# Patient Record
Sex: MALE | NOT HISPANIC OR LATINO | URBAN - METROPOLITAN AREA
[De-identification: names, ages, dates, MRNs, and addresses within clinical notes are randomized per-mention and may not be internally consistent; named-entity substitution may affect disease eponyms.]

---

## 2019-05-02 ENCOUNTER — OFFICE VISIT - HEALTHEAST (OUTPATIENT)
Dept: FAMILY MEDICINE | Facility: CLINIC | Age: 49
End: 2019-05-02

## 2019-05-02 DIAGNOSIS — C83.30 DIFFUSE LARGE B-CELL LYMPHOMA, UNSPECIFIED BODY REGION (H): ICD-10-CM

## 2019-05-02 DIAGNOSIS — Z00.00 ROUTINE GENERAL MEDICAL EXAMINATION AT A HEALTH CARE FACILITY: ICD-10-CM

## 2019-05-02 DIAGNOSIS — Z91.09 ENVIRONMENTAL ALLERGIES: ICD-10-CM

## 2019-05-02 ASSESSMENT — MIFFLIN-ST. JEOR: SCORE: 1814.37

## 2019-05-03 ENCOUNTER — AMBULATORY - HEALTHEAST (OUTPATIENT)
Dept: LAB | Facility: CLINIC | Age: 49
End: 2019-05-03

## 2019-05-03 DIAGNOSIS — C83.30 DIFFUSE LARGE B-CELL LYMPHOMA, UNSPECIFIED BODY REGION (H): ICD-10-CM

## 2019-05-03 DIAGNOSIS — Z00.00 ROUTINE GENERAL MEDICAL EXAMINATION AT A HEALTH CARE FACILITY: ICD-10-CM

## 2019-05-03 LAB
ALBUMIN SERPL-MCNC: 4.1 G/DL (ref 3.5–5)
ALBUMIN UR-MCNC: NEGATIVE MG/DL
ALP SERPL-CCNC: 80 U/L (ref 45–120)
ALT SERPL W P-5'-P-CCNC: 31 U/L (ref 0–45)
ANION GAP SERPL CALCULATED.3IONS-SCNC: 11 MMOL/L (ref 5–18)
APPEARANCE UR: CLEAR
AST SERPL W P-5'-P-CCNC: 29 U/L (ref 0–40)
BILIRUB DIRECT SERPL-MCNC: 0.5 MG/DL
BILIRUB SERPL-MCNC: 1.5 MG/DL (ref 0–1)
BILIRUB UR QL STRIP: NEGATIVE
BUN SERPL-MCNC: 16 MG/DL (ref 8–22)
CALCIUM SERPL-MCNC: 9.7 MG/DL (ref 8.5–10.5)
CHLORIDE BLD-SCNC: 104 MMOL/L (ref 98–107)
CHOLEST SERPL-MCNC: 197 MG/DL
CO2 SERPL-SCNC: 25 MMOL/L (ref 22–31)
COLOR UR AUTO: YELLOW
CREAT SERPL-MCNC: 0.9 MG/DL (ref 0.7–1.3)
ERYTHROCYTE [DISTWIDTH] IN BLOOD BY AUTOMATED COUNT: 11.1 % (ref 11–14.5)
FASTING STATUS PATIENT QL REPORTED: YES
GFR SERPL CREATININE-BSD FRML MDRD: >60 ML/MIN/1.73M2
GLUCOSE BLD-MCNC: 89 MG/DL (ref 70–125)
GLUCOSE UR STRIP-MCNC: NEGATIVE MG/DL
HCT VFR BLD AUTO: 49.7 % (ref 40–54)
HDLC SERPL-MCNC: 47 MG/DL
HGB BLD-MCNC: 16.5 G/DL (ref 14–18)
HGB UR QL STRIP: NEGATIVE
KETONES UR STRIP-MCNC: NEGATIVE MG/DL
LDLC SERPL CALC-MCNC: 133 MG/DL
LEUKOCYTE ESTERASE UR QL STRIP: NEGATIVE
MCH RBC QN AUTO: 31 PG (ref 27–34)
MCHC RBC AUTO-ENTMCNC: 33.2 G/DL (ref 32–36)
MCV RBC AUTO: 93 FL (ref 80–100)
NITRATE UR QL: NEGATIVE
PH UR STRIP: 7 [PH] (ref 5–8)
PLATELET # BLD AUTO: 217 THOU/UL (ref 140–440)
PMV BLD AUTO: 7.3 FL (ref 7–10)
POTASSIUM BLD-SCNC: 4.2 MMOL/L (ref 3.5–5)
PROT SERPL-MCNC: 7 G/DL (ref 6–8)
PSA SERPL-MCNC: 0.4 NG/ML (ref 0–2.5)
RBC # BLD AUTO: 5.32 MILL/UL (ref 4.4–6.2)
SODIUM SERPL-SCNC: 140 MMOL/L (ref 136–145)
SP GR UR STRIP: 1.02 (ref 1–1.03)
TRIGL SERPL-MCNC: 86 MG/DL
UROBILINOGEN UR STRIP-ACNC: NORMAL
WBC: 5.9 THOU/UL (ref 4–11)

## 2019-05-16 ENCOUNTER — HOSPITAL ENCOUNTER (OUTPATIENT)
Dept: CARDIOLOGY | Facility: HOSPITAL | Age: 49
Discharge: HOME | End: 2019-05-16
Attending: FAMILY MEDICINE

## 2019-05-16 DIAGNOSIS — C83.30 DIFFUSE LARGE B-CELL LYMPHOMA, UNSPECIFIED BODY REGION (H): ICD-10-CM

## 2019-05-16 ASSESSMENT — MIFFLIN-ST. JEOR: SCORE: 1814.37

## 2019-05-17 LAB
AORTIC ROOT: 3.3 CM
AORTIC VALVE MEAN VELOCITY: 109 CM/S
AV DIMENSIONLESS INDEX VTI: 0.7
AV MEAN GRADIENT: 5 MMHG
AV PEAK GRADIENT: 6.8 MMHG
AV VALVE AREA: 2.3 CM2
AV VELOCITY RATIO: 0.8
BSA FOR ECHO PROCEDURE: 2.17 M2
CV BLOOD PRESSURE: NORMAL MMHG
CV ECHO HEIGHT: 69.8 IN
CV ECHO WEIGHT: 211 LBS
DOP CALC AO PEAK VEL: 130 CM/S
DOP CALC AO VTI: 32.8 CM
DOP CALC LVOT AREA: 3.46 CM2
DOP CALC LVOT DIAMETER: 2.1 CM
DOP CALC LVOT PEAK VEL: 97.5 CM/S
DOP CALC LVOT STROKE VOLUME: 76.2 CM3
DOP CALCLVOT PEAK VEL VTI: 22 CM
EJECTION FRACTION: 63 % (ref 55–75)
FRACTIONAL SHORTENING: 28 % (ref 28–44)
INTERVENTRICULAR SEPTUM IN END DIASTOLE: 0.72 CM (ref 0.6–1)
IVS/PW RATIO: 0.7
LEFT ATRIUM SIZE: 3.8 CM
LEFT VENTRICLE CARDIAC INDEX: 2.1 L/MIN/M2
LEFT VENTRICLE CARDIAC OUTPUT: 4.5 L/MIN
LEFT VENTRICLE DIASTOLIC VOLUME INDEX: 56.2 CM3/M2 (ref 34–74)
LEFT VENTRICLE DIASTOLIC VOLUME: 122 CM3 (ref 62–150)
LEFT VENTRICLE HEART RATE: 59 BPM
LEFT VENTRICLE MASS INDEX: 53.4 G/M2
LEFT VENTRICLE SYSTOLIC VOLUME INDEX: 20.7 CM3/M2 (ref 11–31)
LEFT VENTRICLE SYSTOLIC VOLUME: 44.9 CM3 (ref 21–61)
LEFT VENTRICULAR INTERNAL DIMENSION IN DIASTOLE: 4.32 CM (ref 4.2–5.8)
LEFT VENTRICULAR INTERNAL DIMENSION IN SYSTOLE: 3.11 CM (ref 2.5–4)
LEFT VENTRICULAR MASS: 115.9 G
LEFT VENTRICULAR OUTFLOW TRACT MEAN GRADIENT: 2 MMHG
LEFT VENTRICULAR OUTFLOW TRACT MEAN VELOCITY: 71.5 CM/S
LEFT VENTRICULAR OUTFLOW TRACT PEAK GRADIENT: 4 MMHG
LEFT VENTRICULAR POSTERIOR WALL IN END DIASTOLE: 0.99 CM (ref 0.6–1)
LV STROKE VOLUME INDEX: 35.1 ML/M2
MITRAL VALVE E/A RATIO: 1.1
MV AVERAGE E/E' RATIO: 6.7 CM/S
MV DECELERATION TIME: 194 MS
MV E'TISSUE VEL-LAT: 13.7 CM/S
MV E'TISSUE VEL-MED: 8.51 CM/S
MV LATERAL E/E' RATIO: 5.4
MV MEDIAL E/E' RATIO: 8.7
MV PEAK A VELOCITY: 67.9 CM/S
MV PEAK E VELOCITY: 74.2 CM/S
NUC REST DIASTOLIC VOLUME INDEX: 3376 LBS
NUC REST SYSTOLIC VOLUME INDEX: 69.75 IN
TRICUSPID REGURGITATION PEAK PRESSURE GRADIENT: 17 MMHG
TRICUSPID VALVE PEAK REGURGITANT VELOCITY: 206 CM/S

## 2019-05-22 ENCOUNTER — COMMUNICATION - HEALTHEAST (OUTPATIENT)
Dept: FAMILY MEDICINE | Facility: CLINIC | Age: 49
End: 2019-05-22

## 2021-05-25 ENCOUNTER — RECORDS - HEALTHEAST (OUTPATIENT)
Dept: ADMINISTRATIVE | Facility: CLINIC | Age: 51
End: 2021-05-25

## 2021-05-28 NOTE — PATIENT INSTRUCTIONS - HE
Increase physical activity.  Your goal is 30 minutes most days  You received the tetanus booster today  Set up the echocardiogram/ultrasound of the heart  You may set up a fasting lab appointment   When you turn 49 you may consider the Shingrix/shingles vaccine  You may consider the colonoscopy at age 49 as well  It was great to see you

## 2021-05-28 NOTE — PROGRESS NOTES
Assessment/ Plan     1. Routine general medical examination at a health care facility    Recommend that he remain physically active  - Lipid Cascade; Future    Reviewed prostate cancer screening  - PSA (Prostatic-Specific Antigen), Annual Screen; Future     A tetanus booster was given     When he turns 49 he can consider the Shingrix/shingles vaccine  When he turns 49 he will be a candidate for a colonoscopy        2. Diffuse large B-cell lymphoma, unspecified body region (H)    Reviewed the consultation from his hematologist dated May 17, 2012 regarding ongoing surveillance  He is in remission but did require chemotherapy which may place him at higher risks including a slightly increased risk of bladder cancer over the long-term  Doxorubicin is a known cardiotoxin and can cause a cardiomyopathy  A repeat echocardiogram has been recommended every 5-8 years given his treatment  Reviewed that if he develops any urinary symptoms of concern than he should have a urinalysis and evaluation as appropriate  We will monitor his blood counts periodically    This was reviewed in detail     After reviewing with the patient decision was made to check an echocardiogram    We will check a urinalysis  We will check blood test as noted     - Echo Complete; Future  - Basic Metabolic Panel; Future  - Hepatic Profile; Future  - HM2(CBC w/o Differential); Future  - Urinalysis-UC if Indicated; Future    3. Environmental allergies    He may be treated with  over-the-counter treatments as needed      Subjective:       Erum Sepulveda is a 48 y.o. male who presents to the clinic for a complete physical examination.  He was last seen in clinic in March 2016.      As noted, he has a history of acute lymphoma which was treated at the [facility].  He had diffuse large B-cell lymphoma clinical stage IIIa.  At the time symptoms began with abdominal pain in March 2001 were localized to the right mid to lower abdomen.  This became  more generalized and he had early satiety and weight loss.  Imaging revealed an abdominal mass and biopsy was performed.  At that time he was treated with 8 courses of CHOP regimen chemotherapy beginning in July 2001.  He is slowly responded and was assessed to be in complete remission and has been off therapy since January 2002.  He has followed up with [facility] and a note from May 17, 2012 has been reviewed.  Risks have been discussed and we reviewed the residual risks from his chemotherapy treatments.  Cyclophosphamide is sometimes associated with secondary ischemia as her myelodysplasia or bladder cancer.  Any unexplained cytopenias should be evaluated.  Also, as noted bladder cancer can be at increased risk.  Also, because he received doxorubicin which is a known cardiotoxin is possible he could develop a cardiomyopathy and a repeat echocardiogram has been recommended every 5-8 years.  Regular physical examinations have been recommended.    He reports that he generally has been doing well.  He does feel the effects of aging with changes in his hair.  In the past year total cholesterol 171 with an LDL of 110.    Review of systems is negative for any other significant concerning symptoms.  He has not had chest pain, shortness of breath, palpitations, bowel changes, or urinary concerns.  His energy level has generally been stable.    Social history is notable for the fact that he is  and has children.  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    The following portions of the patient's history were reviewed and updated as appropriate: allergies, current medications, past family history, past medical history, past social history, past surgical history and problem list. Medications have been reconciled    Review of Systems   A 12 point comprehensive review of systems was negative except as noted.      No current outpatient medications on file.     No current  "facility-administered medications for this visit.        Objective:      /68   Pulse 60   Temp 98.6  F (37  C)   Resp 16   Ht 5' 9.75\" (1.772 m)   Wt 211 lb (95.7 kg)   BMI 30.49 kg/m        General appearance: alert, appears stated age and cooperative  Head: Normocephalic, without obvious abnormality, atraumatic  Eyes: conjunctivae/corneas clear. PERRL, EOM's intact.   Ears: normal TM's and external ear canals both ears  Nose: Nares normal. Septum midline. Mucosa normal. No drainage or sinus tenderness.  Throat: lips, mucosa, and tongue normal; teeth and gums normal  Neck: no adenopathy, supple, symmetrical, trachea midline and thyroid not enlarged, symmetric, no tenderness/mass/nodules  Back: symmetric, no curvature. ROM normal. No CVA tenderness.  Lungs: clear to auscultation bilaterally  Heart: regular rate and rhythm, S1, S2 normal, no murmur, click, rub or gallop  Abdomen: soft, non-tender; bowel sounds normal; no masses,  no organomegaly  Genitourinary: Penis is circumcised, no scrotal masses, no inguinal hernia  Rectal: Normal sphincter tone, prostate smooth and symmetric  Extremities: extremities normal, atraumatic, no cyanosis or edema  Lymph nodes: Cervical nodes normal.  Neurologic: Alert and oriented X 3         Recent Results (from the past 168 hour(s))   Basic Metabolic Panel   Result Value Ref Range    Sodium 140 136 - 145 mmol/L    Potassium 4.2 3.5 - 5.0 mmol/L    Chloride 104 98 - 107 mmol/L    CO2 25 22 - 31 mmol/L    Anion Gap, Calculation 11 5 - 18 mmol/L    Glucose 89 70 - 125 mg/dL    Calcium 9.7 8.5 - 10.5 mg/dL    BUN 16 8 - 22 mg/dL    Creatinine 0.90 0.70 - 1.30 mg/dL    GFR MDRD Af Amer >60 >60 mL/min/1.73m2    GFR MDRD Non Af Amer >60 >60 mL/min/1.73m2   Hepatic Profile   Result Value Ref Range    Bilirubin, Total 1.5 (H) 0.0 - 1.0 mg/dL    Bilirubin, Direct 0.5 <=0.5 mg/dL    Protein, Total 7.0 6.0 - 8.0 g/dL    Albumin 4.1 3.5 - 5.0 g/dL    Alkaline Phosphatase 80 45 - 120 " U/L    AST 29 0 - 40 U/L    ALT 31 0 - 45 U/L   HM2(CBC w/o Differential)   Result Value Ref Range    WBC 5.9 4.0 - 11.0 thou/uL    RBC 5.32 4.40 - 6.20 mill/uL    Hemoglobin 16.5 14.0 - 18.0 g/dL    Hematocrit 49.7 40.0 - 54.0 %    MCV 93 80 - 100 fL    MCH 31.0 27.0 - 34.0 pg    MCHC 33.2 32.0 - 36.0 g/dL    RDW 11.1 11.0 - 14.5 %    Platelets 217 140 - 440 thou/uL    MPV 7.3 7.0 - 10.0 fL   Lipid Cascade   Result Value Ref Range    Cholesterol 197 <=199 mg/dL    Triglycerides 86 <=149 mg/dL    HDL Cholesterol 47 >=40 mg/dL    LDL Calculated 133 (H) <=129 mg/dL    Patient Fasting > 8hrs? Yes    PSA (Prostatic-Specific Antigen), Annual Screen   Result Value Ref Range    PSA 0.4 0.0 - 2.5 ng/mL   Urinalysis-UC if Indicated   Result Value Ref Range    Color, UA Yellow Colorless, Yellow, Straw, Light Yellow    Clarity, UA Clear Clear    Glucose, UA Negative Negative    Bilirubin, UA Negative Negative    Ketones, UA Negative Negative    Specific Gravity, UA 1.020 1.005 - 1.030    Blood, UA Negative Negative    pH, UA 7.0 5.0 - 8.0    Protein, UA Negative Negative mg/dL    Urobilinogen, UA 0.2 E.U./dL 0.2 E.U./dL, 1.0 E.U./dL    Nitrite, UA Negative Negative    Leukocytes, UA Negative Negative          This note has been dictated using voice recognition software. Any grammatical or context distortions are unintentional and inherent to the software

## 2021-05-29 NOTE — TELEPHONE ENCOUNTER
Test Results  Who is calling?:  Patient  Who ordered the test:  Duncan Trivedi MD   Type of test: Lab  Date of test:  5/3/19  Where was the test performed:  clinic  What are your questions/concerns?:  Patient did not receive lab work result.  Normal results were given to the patient.  Please call patient with any concern about abnormal results.  Okay to leave a detailed message?:  Yes

## 2021-06-03 VITALS — WEIGHT: 211 LBS | HEIGHT: 70 IN | BODY MASS INDEX: 30.21 KG/M2

## 2021-06-03 VITALS — BODY MASS INDEX: 30.21 KG/M2 | HEIGHT: 70 IN | WEIGHT: 211 LBS

## 2021-06-19 NOTE — LETTER
Letter by Duncan Trivedi MD at      Author: Duncan Trivedi MD Service: -- Author Type: --    Filed:  Encounter Date: 5/22/2019 Status: (Other)         Erum Sepulveda  [id number] [facility] [id number]             May 22, 2019         Dear Mr. Sepulveda,    Below are the results from your recent visit:    Resulted Orders   Basic Metabolic Panel   Result Value Ref Range    Sodium 140 136 - 145 mmol/L    Potassium 4.2 3.5 - 5.0 mmol/L    Chloride 104 98 - 107 mmol/L    CO2 25 22 - 31 mmol/L    Anion Gap, Calculation 11 5 - 18 mmol/L    Glucose 89 70 - 125 mg/dL    Calcium 9.7 8.5 - 10.5 mg/dL    BUN 16 8 - 22 mg/dL    Creatinine 0.90 0.70 - 1.30 mg/dL    GFR MDRD Af Amer >60 >60 mL/min/1.73m2    GFR MDRD Non Af Amer >60 >60 mL/min/1.73m2    Narrative    Fasting Glucose reference range is 70-99 mg/dL per  American Diabetes Association (ADA) guidelines.   Hepatic Profile   Result Value Ref Range    Bilirubin, Total 1.5 (H) 0.0 - 1.0 mg/dL    Bilirubin, Direct 0.5 <=0.5 mg/dL    Protein, Total 7.0 6.0 - 8.0 g/dL    Albumin 4.1 3.5 - 5.0 g/dL    Alkaline Phosphatase 80 45 - 120 U/L    AST 29 0 - 40 U/L    ALT 31 0 - 45 U/L   HM2(CBC w/o Differential)   Result Value Ref Range    WBC 5.9 4.0 - 11.0 thou/uL    RBC 5.32 4.40 - 6.20 mill/uL    Hemoglobin 16.5 14.0 - 18.0 g/dL    Hematocrit 49.7 40.0 - 54.0 %    MCV 93 80 - 100 fL    MCH 31.0 27.0 - 34.0 pg    MCHC 33.2 32.0 - 36.0 g/dL    RDW 11.1 11.0 - 14.5 %    Platelets 217 140 - 440 thou/uL    MPV 7.3 7.0 - 10.0 fL   Lipid Cascade   Result Value Ref Range    Cholesterol 197 <=199 mg/dL    Triglycerides 86 <=149 mg/dL    HDL Cholesterol 47 >=40 mg/dL    LDL Calculated 133 (H) <=129 mg/dL    Patient Fasting > 8hrs? Yes    PSA (Prostatic-Specific Antigen), Annual Screen   Result Value Ref Range    PSA 0.4 0.0 - 2.5 ng/mL    Narrative    Method is Abbott Prostate-Specific Antigen (PSA)  Standard-WHO 1st International (90:10)   Urinalysis-UC if  Indicated   Result Value Ref Range    Color, UA Yellow Colorless, Yellow, Straw, Light Yellow    Clarity, UA Clear Clear    Glucose, UA Negative Negative    Bilirubin, UA Negative Negative    Ketones, UA Negative Negative    Specific Gravity, UA 1.020 1.005 - 1.030    Blood, UA Negative Negative    pH, UA 7.0 5.0 - 8.0    Protein, UA Negative Negative mg/dL    Urobilinogen, UA 0.2 E.U./dL 0.2 E.U./dL, 1.0 E.U./dL    Nitrite, UA Negative Negative    Leukocytes, UA Negative Negative    Narrative    Microscopic not indicated  UC not indicated         Erum,    Here is a copy of your test results.  Your kidney profile is normal.  Your blood sugar test  and prostate test are normal.  Your blood counts are also normal.    Your cholesterol numbers are more elevated than in the past.  Normal LDL cholesterol is less than 130.  Work to improve your diet and exercise.  Here are some tips.    Consume a diet that encourages vegetables, fruits, and whole grains  Dairy products should be low fat  Eat more poultry, fish, beans and nuts as a primary protein source  Limit sweets, sugar-sweetened beverages, and red meats  Use healthy oils like olive oil or canola oil for cooking  Limit high fat foods  Drink more water  Limit salt in your diet  Get plenty of aerobic physical activity    The liver tests show a mildly elevated bilirubin level. Given the other liver enzymes are normal we can recheck this in 6 months to see if there is a change.     The urine test is normal.           Please call with questions or contact us using [employer].    Sincerely,        Electronically signed by Duncan Trivedi MD

## 2021-07-07 ENCOUNTER — COMMUNICATION - HEALTHEAST (OUTPATIENT)
Dept: FAMILY MEDICINE | Facility: CLINIC | Age: 51
End: 2021-07-07

## 2021-07-07 DIAGNOSIS — Z12.11 COLON CANCER SCREENING: ICD-10-CM

## 2021-07-07 NOTE — TELEPHONE ENCOUNTER
Telephone Encounter by Hermes Coleman at 7/7/2021  8:44 AM     Author: Hermes Coleman Service: -- Author Type: --    Filed: 7/7/2021  8:46 AM Encounter Date: 7/7/2021 Status: Signed    : Hermes Coleman       Reason for Call: Request for an order or referral:    Order or referral being requested: Colonoscopy     Date needed: as soon as possible    Has the patient been seen by the PCP for this problem? NO    Additional comments: Patient's wife Manuela called stating that patient is due for one but they were told provider need to order it in order to schedule.     Phone number Patient can be reached at:  Home number on file [phone] (home)    Best Time:  Any time    Can we leave a detailed message on this number?  Yes    Call taken on 7/7/2021 at 8:45 AM by Hermes Coleman

## 2021-07-07 NOTE — TELEPHONE ENCOUNTER
Telephone Encounter by Stephany Yadav LPN at 7/7/2021 11:37 AM     Author: Stephany Yadav LPN Service: -- Author Type: Licensed Nurse    Filed: 7/7/2021 11:38 AM Encounter Date: 7/7/2021 Status: Signed    : Stephany Yadav LPN (Licensed Nurse)       Colonoscopy referral pending for PCP review

## 2021-07-07 NOTE — TELEPHONE ENCOUNTER
Telephone Encounter by Valeria Chand CMA at 7/7/2021  1:33 PM     Author: Valeria Chand CMA Service: -- Author Type: Certified Medical Assistant    Filed: 7/7/2021  1:33 PM Encounter Date: 7/7/2021 Status: Signed    : Valeria Chand CMA (Certified Medical Assistant)       Left message to call back for: referral   Information to relay to patient:  Left detailed message referral placed

## 2021-07-07 NOTE — TELEPHONE ENCOUNTER
Telephone Encounter by Duncan Trivedi MD at 7/7/2021 12:20 PM     Author: Duncan Trivedi MD Service: -- Author Type: Physician    Filed: 7/7/2021 12:20 PM Encounter Date: 7/7/2021 Status: Signed    : Duncan Trivedi MD (Physician)       I entered the referral.

## 2025-03-24 SDOH — HEALTH STABILITY: PHYSICAL HEALTH: ON AVERAGE, HOW MANY DAYS PER WEEK DO YOU ENGAGE IN MODERATE TO STRENUOUS EXERCISE (LIKE A BRISK WALK)?: 3 DAYS

## 2025-03-24 SDOH — HEALTH STABILITY: PHYSICAL HEALTH: ON AVERAGE, HOW MANY MINUTES DO YOU ENGAGE IN EXERCISE AT THIS LEVEL?: 30 MIN

## 2025-03-24 ASSESSMENT — SOCIAL DETERMINANTS OF HEALTH (SDOH): HOW OFTEN DO YOU GET TOGETHER WITH FRIENDS OR RELATIVES?: TWICE A WEEK

## 2025-03-31 ENCOUNTER — OFFICE VISIT (OUTPATIENT)
Dept: FAMILY MEDICINE | Facility: CLINIC | Age: 55
End: 2025-03-31
Payer: COMMERCIAL

## 2025-03-31 VITALS
DIASTOLIC BLOOD PRESSURE: 78 MMHG | BODY MASS INDEX: 31.15 KG/M2 | OXYGEN SATURATION: 98 % | RESPIRATION RATE: 17 BRPM | TEMPERATURE: 96.9 F | HEIGHT: 70 IN | HEART RATE: 63 BPM | SYSTOLIC BLOOD PRESSURE: 119 MMHG | WEIGHT: 217.6 LBS

## 2025-03-31 DIAGNOSIS — L98.9 SKIN LESION: ICD-10-CM

## 2025-03-31 DIAGNOSIS — Z13.220 SCREENING FOR LIPOID DISORDERS: ICD-10-CM

## 2025-03-31 DIAGNOSIS — C85.99 EXTRANODAL LYMPHOMA (H): ICD-10-CM

## 2025-03-31 DIAGNOSIS — Z00.00 ROUTINE GENERAL MEDICAL EXAMINATION AT A HEALTH CARE FACILITY: Primary | ICD-10-CM

## 2025-03-31 DIAGNOSIS — Z13.1 SCREENING FOR DIABETES MELLITUS: ICD-10-CM

## 2025-03-31 DIAGNOSIS — Z12.11 SCREEN FOR COLON CANCER: ICD-10-CM

## 2025-03-31 LAB
CHOLEST SERPL-MCNC: 213 MG/DL
ERYTHROCYTE [DISTWIDTH] IN BLOOD BY AUTOMATED COUNT: 12.1 % (ref 10–15)
FASTING STATUS PATIENT QL REPORTED: YES
FASTING STATUS PATIENT QL REPORTED: YES
GLUCOSE SERPL-MCNC: 100 MG/DL (ref 70–99)
HCT VFR BLD AUTO: 48.1 % (ref 40–53)
HDLC SERPL-MCNC: 47 MG/DL
HGB BLD-MCNC: 16.6 G/DL (ref 13.3–17.7)
LDLC SERPL CALC-MCNC: 149 MG/DL
MCH RBC QN AUTO: 30.9 PG (ref 26.5–33)
MCHC RBC AUTO-ENTMCNC: 34.5 G/DL (ref 31.5–36.5)
MCV RBC AUTO: 90 FL (ref 78–100)
NONHDLC SERPL-MCNC: 166 MG/DL
PLATELET # BLD AUTO: 227 10E3/UL (ref 150–450)
RBC # BLD AUTO: 5.37 10E6/UL (ref 4.4–5.9)
TRIGL SERPL-MCNC: 84 MG/DL
WBC # BLD AUTO: 6.1 10E3/UL (ref 4–11)

## 2025-03-31 PROCEDURE — 36415 COLL VENOUS BLD VENIPUNCTURE: CPT | Performed by: FAMILY MEDICINE

## 2025-03-31 PROCEDURE — 3074F SYST BP LT 130 MM HG: CPT | Performed by: FAMILY MEDICINE

## 2025-03-31 PROCEDURE — 1126F AMNT PAIN NOTED NONE PRSNT: CPT | Performed by: FAMILY MEDICINE

## 2025-03-31 PROCEDURE — 80061 LIPID PANEL: CPT | Performed by: FAMILY MEDICINE

## 2025-03-31 PROCEDURE — 99386 PREV VISIT NEW AGE 40-64: CPT | Performed by: FAMILY MEDICINE

## 2025-03-31 PROCEDURE — 82947 ASSAY GLUCOSE BLOOD QUANT: CPT | Performed by: FAMILY MEDICINE

## 2025-03-31 PROCEDURE — 85027 COMPLETE CBC AUTOMATED: CPT | Performed by: FAMILY MEDICINE

## 2025-03-31 PROCEDURE — 3078F DIAST BP <80 MM HG: CPT | Performed by: FAMILY MEDICINE

## 2025-03-31 PROCEDURE — 99213 OFFICE O/P EST LOW 20 MIN: CPT | Mod: 25 | Performed by: FAMILY MEDICINE

## 2025-03-31 ASSESSMENT — PAIN SCALES - GENERAL: PAINLEVEL_OUTOF10: NO PAIN (0)

## 2025-03-31 NOTE — PROGRESS NOTES
"Preventive Care Visit  [facility]  DAV HERRERA MD, Family Medicine  Mar 31, 2025      Assessment & Plan     Routine general medical examination at a health care facility       Screen for colon cancer     - Colonoscopy Screening  Referral; Future    Extranodal lymphoma (H)     - CBC with platelets; Future  - CBC with platelets    Screening for lipoid disorders     - Lipid panel reflex to direct LDL Fasting; Future  - Lipid panel reflex to direct LDL Fasting    Screening for diabetes mellitus     - Glucose; Future  - Glucose    Skin lesion  Patient has some persistent red lesions along right cheek and left cheek area.    - Adult Dermatology  Referral; Future    Patient has been advised of split billing requirements and indicates understanding: Yes        BMI  Estimated body mass index is 31.13 kg/m  as calculated from the following:    Height as of this encounter: 1.781 m (5' 10.1\").    Weight as of this encounter: 98.7 kg (217 lb 9.6 oz).       Counseling  Appropriate preventive services were addressed with this patient via screening, questionnaire, or discussion as appropriate for fall prevention, nutrition, physical activity, Tobacco-use cessation, social engagement, weight loss and cognition.  Checklist reviewing preventive services available has been given to the patient.  Reviewed patient's diet, addressing concerns and/or questions.   He is at risk for lack of exercise and has been provided with information to increase physical activity for the benefit of his well-being.   He is at risk for psychosocial distress and has been provided with information to reduce risk.           Theresa Danielson is a 54 year old, presenting for the following:  Annual Visit        3/31/2025     9:23 AM   Additional Questions   Roomed by Marlys SANTIAGO   Accompanied by self      1)    RLQ fullness-patient reports some right lower quadrant fullness over the last 2 to 3 weeks.  No pain.  " Overall bowel movements have been normal, may be soft over the last year or so.  No blood or melena.  No nausea or vomiting.  2)   HCM-patient reports last colonoscopy was done through [facility], 5 years ago.  Recommend repeat in 5 years due to family history.      Would like to get blood work done today.  Also would like to discuss colonoscopy, wondering if he is due for it.          Advance Care Planning  Patient does not have a Health Care Directive      3/24/2025   General Health   How would you rate your overall physical health? (!) DECLINE   Feel stress (tense, anxious, or unable to sleep) Very much   (!) STRESS CONCERN      3/24/2025   Nutrition   Three or more servings of calcium each day? Yes   Diet: Regular (no restrictions)   How many servings of fruit and vegetables per day? (!) 2-3   How many sweetened beverages each day? 0-1         3/24/2025   Exercise   Days per week of moderate/strenous exercise 3 days   Average minutes spent exercising at this level 30 min         3/24/2025   Social Factors   Frequency of gathering with friends or relatives Twice a week   Worry food won't last until get money to buy more No   Food not last or not have enough money for food? No   Do you have housing? (Housing is defined as stable permanent housing and does not include staying ouside in a car, in a tent, in an abandoned building, in an overnight shelter, or couch-surfing.) Yes   Are you worried about losing your housing? No   Lack of transportation? No   Unable to get utilities (heat,electricity)? No         3/24/2025   Fall Risk   Fallen 2 or more times in the past year? No   Trouble with walking or balance? No          3/24/2025   Dental   Dentist two times every year? Yes           Today's PHQ-2 Score:       3/31/2025     9:13 AM   PHQ-2 ( 1999 [employer])   Q1: Little interest or pleasure in doing things 1   Q2: Feeling down, depressed or hopeless 1   PHQ-2 Score 2    Q1: Little interest or pleasure in doing  "things Several days   Q2: Feeling down, depressed or hopeless Several days   PHQ-2 Score 2       Patient-reported           3/24/2025   Substance Use   Alcohol more than 3/day or more than 7/wk No   Do you use any other substances recreationally? No     Social History     Tobacco Use    Smoking status: Never    Smokeless tobacco: Never   Vaping Use    Vaping status: Never Used             3/24/2025   One time HIV Screening   Previous HIV test? Yes         3/24/2025   STI Screening   New sexual partner(s) since last STI/HIV test? No   Last PSA:   Prostate Specific Antigen Screen   Date Value Ref Range Status   05/03/2019 0.4 0.0 - 2.5 ng/mL Final     ASCVD Risk   The ASCVD Risk score (Izabella MANE, et al., 2019) failed to calculate for the following reasons:    Cannot find a previous HDL lab    Cannot find a previous total cholesterol lab           Reviewed and updated as needed this visit by Provider                    No past medical history on file.  Past Surgical History:   Procedure Laterality Date    REMOVAL OF SPERM DUCT(S)      Description: Surgery Of Male Genitalia Vasectomy;  Recorded: 06/13/2011;  Comments: june 13, 2011    REMOVAL OF SPERM DUCT(S)      Description: Surgery Of Male Genitalia Vasectomy;  Recorded: 07/29/2013;    [facility] EXPLORATORY OF ABDOMEN      Description: Exploratory Laparotomy;  Recorded: 05/10/2011;         Review of Systems  Constitutional, HEENT, cardiovascular, pulmonary, gi and gu systems are negative, except as otherwise noted.     Objective    Exam  /78 (BP Location: Right arm, Patient Position: Sitting, Cuff Size: Adult Large)   Pulse 63   Temp 96.9  F (36.1  C) (Tympanic)   Resp 17   Ht 1.781 m (5' 10.1\")   Wt 98.7 kg (217 lb 9.6 oz)   SpO2 98%   BMI 31.13 kg/m     Estimated body mass index is 31.13 kg/m  as calculated from the following:    Height as of this encounter: 1.781 m (5' 10.1\").    Weight as of this encounter: 98.7 kg (217 lb 9.6 oz).    Physical " Exam  GENERAL: alert and no distress  NECK: no adenopathy, no asymmetry, masses, or scars  RESP: lungs clear to auscultation - no rales, rhonchi or wheezes  CV: regular rate and rhythm, normal S1 S2, no S3 or S4, no murmur, click or rub, no peripheral edema  ABDOMEN: soft, nontender, no hepatosplenomegaly, no masses and bowel sounds normal  MS: no gross musculoskeletal defects noted, no edema        Signed Electronically by: DAV HERRERA MD

## 2025-03-31 NOTE — PATIENT INSTRUCTIONS
Patient Education   Preventive Care Advice   This is general advice given by our system to help you stay healthy. However, your care team may have specific advice just for you. Please talk to your care team about your preventive care needs.  Nutrition  Eat 5 or more servings of fruits and vegetables each day.  Try wheat bread, brown rice and whole grain pasta (instead of white bread, rice, and pasta).  Get enough calcium and vitamin D. Check the label on foods and aim for 100% of the RDA (recommended daily allowance).  Lifestyle  Exercise at least 150 minutes each week  (30 minutes a day, 5 days a week).  Do muscle strengthening activities 2 days a week. These help control your weight and prevent disease.  No smoking.  Wear sunscreen to prevent skin cancer.  Have a dental exam and cleaning every 6 months.  Yearly exams  See your health care team every year to talk about:  Any changes in your health.  Any medicines your care team has prescribed.  Preventive care, family planning, and ways to prevent chronic diseases.  Shots (vaccines)   HPV shots (up to age 26), if you've never had them before.  Hepatitis B shots (up to age 59), if you've never had them before.  COVID-19 shot: Get this shot when it's due.  Flu shot: Get a flu shot every year.  Tetanus shot: Get a tetanus shot every 10 years.  Pneumococcal, hepatitis A, and RSV shots: Ask your care team if you need these based on your risk.  Shingles shot (for age 50 and up)  General health tests  Diabetes screening:  Starting at age 35, Get screened for diabetes at least every 3 years.  If you are younger than age 35, ask your care team if you should be screened for diabetes.  Cholesterol test: At age 39, start having a cholesterol test every 5 years, or more often if advised.  Bone density scan (DEXA): At age 50, ask your care team if you should have this scan for osteoporosis (brittle bones).  Hepatitis C: Get tested at least once in your life.  STIs (sexually  transmitted infections)  Before age 24: Ask your care team if you should be screened for STIs.  After age 24: Get screened for STIs if you're at risk. You are at risk for STIs (including HIV) if:  You are sexually active with more than one person.  You don't use condoms every time.  You or a partner was diagnosed with a sexually transmitted infection.  If you are at risk for HIV, ask about PrEP medicine to prevent HIV.  Get tested for HIV at least once in your life, whether you are at risk for HIV or not.  Cancer screening tests  Cervical cancer screening: If you have a cervix, begin getting regular cervical cancer screening tests starting at age 21.  Breast cancer scan (mammogram): If you've ever had breasts, begin having regular mammograms starting at age 40. This is a scan to check for breast cancer.  Colon cancer screening: It is important to start screening for colon cancer at age 45.  Have a colonoscopy test every 10 years (or more often if you're at risk) Or, ask your provider about stool tests like a FIT test every year or Cologuard test every 3 years.  To learn more about your testing options, visit:   .  For help making a decision, visit:   https://bit.ly/al16257.  Prostate cancer screening test: If you have a prostate, ask your care team if a prostate cancer screening test (PSA) at age 55 is right for you.  Lung cancer screening: If you are a current or former smoker ages 50 to 80, ask your care team if ongoing lung cancer screenings are right for you.  For informational purposes only. Not to replace the advice of your health care provider. Copyright   2023 [facility]. All rights reserved. Clinically reviewed by the [facility] Transitions Program. [employer] [id number] - REV 01/24.  Learning About Stress  What is stress?     Stress is your body's response to a hard situation. Your body can have a physical, emotional, or mental response. Stress is a fact of life for most people, and it  affects everyone differently. What causes stress for you may not be stressful for someone else.  A lot of things can cause stress. You may feel stress when you go on a job interview, take a test, or run a race. This kind of short-term stress is normal and even useful. It can help you if you need to work hard or react quickly. For example, stress can help you finish an important job on time.  Long-term stress is caused by ongoing stressful situations or events. Examples of long-term stress include long-term health problems, ongoing problems at work, or conflicts in your family. Long-term stress can harm your health.  How does stress affect your health?  When you are stressed, your body responds as though you are in danger. It makes hormones that speed up your heart, make you breathe faster, and give you a burst of energy. This is called the fight-or-flight stress response. If the stress is over quickly, your body goes back to normal and no harm is done.  But if stress happens too often or lasts too long, it can have bad effects. Long-term stress can make you more likely to get sick, and it can make symptoms of some diseases worse. If you tense up when you are stressed, you may develop neck, shoulder, or low back pain. Stress is linked to high blood pressure and heart disease.  Stress also harms your emotional health. It can make you bashir, tense, or depressed. Your relationships may suffer, and you may not do well at work or school.  What can you do to manage stress?  You can try these things to help manage stress:   Do something active. Exercise or activity can help reduce stress. Walking is a great way to get started. Even everyday activities such as housecleaning or yard work can help.  Try yoga or ryder chi. These techniques combine exercise and meditation. You may need some training at first to learn them.  Do something you enjoy. For example, listen to music or go to a movie. Practice your hobby or do volunteer  "work.  Meditate. This can help you relax, because you are not worrying about what happened before or what may happen in the future.  Do guided imagery. Imagine yourself in any setting that helps you feel calm. You can use online videos, books, or a teacher to guide you.  Do breathing exercises. For example:  From a standing position, bend forward from the waist with your knees slightly bent. Let your arms dangle close to the floor.  Breathe in slowly and deeply as you return to a standing position. Roll up slowly and lift your head last.  Hold your breath for just a few seconds in the standing position.  Breathe out slowly and bend forward from the waist.  Let your feelings out. Talk, laugh, cry, and express anger when you need to. Talking with supportive friends or family, a counselor, or a johanna leader about your feelings is a healthy way to relieve stress. Avoid discussing your feelings with people who make you feel worse.  Write. It may help to write about things that are bothering you. This helps you find out how much stress you feel and what is causing it. When you know this, you can find better ways to cope.  What can you do to prevent stress?  You might try some of these things to help prevent stress:  Manage your time. This helps you find time to do the things you want and need to do.  Get enough sleep. Your body recovers from the stresses of the day while you are sleeping.  Get support. Your family, friends, and community can make a difference in how you experience stress.  Limit your news feed. Avoid or limit time on social media or news that may make you feel stressed.  Do something active. Exercise or activity can help reduce stress. Walking is a great way to get started.  Where can you learn more?  Go to https://www.[employer].net/patiented  Enter N032 in the search box to learn more about \"Learning About Stress.\"  Current as of: October 24, 2024  Content Version: 14.4 2024-2025 [employer].   Care instructions adapted under license by your healthcare professional. If you have questions about a medical condition or this instruction, always ask your healthcare professional. [employer] disclaims any warranty or liability for your use of this information.

## 2025-04-02 ENCOUNTER — TELEPHONE (OUTPATIENT)
Dept: GASTROENTEROLOGY | Facility: CLINIC | Age: 55
End: 2025-04-02
Payer: COMMERCIAL

## 2025-04-02 ENCOUNTER — ANESTHESIA EVENT (OUTPATIENT)
Dept: GASTROENTEROLOGY | Facility: CLINIC | Age: 55
End: 2025-04-02
Payer: COMMERCIAL

## 2025-04-02 NOTE — ANESTHESIA PREPROCEDURE EVALUATION
Anesthesia Pre-Procedure Evaluation    Patient: Erum Sepulveda   MRN: [id number] : 1970        Procedure : Procedure(s):  Colonoscopy          No past medical history on file.   Past Surgical History:   Procedure Laterality Date    REMOVAL OF SPERM DUCT(S)      Description: Surgery Of Male Genitalia Vasectomy;  Recorded: 2011;  Comments: 2011    REMOVAL OF SPERM DUCT(S)      Description: Surgery Of Male Genitalia Vasectomy;  Recorded: 2013;    [facility] EXPLORATORY OF ABDOMEN      Description: Exploratory Laparotomy;  Recorded: 05/10/2011;      No Known Allergies   Social History     Tobacco Use    Smoking status: Never    Smokeless tobacco: Never   Substance Use Topics    Alcohol use: Not on file      Wt Readings from Last 1 Encounters:   25 98.7 kg (217 lb 9.6 oz)        Anesthesia Evaluation   Pt has had prior anesthetic. Type: General and MAC.        ROS/MED HX  ENT/Pulmonary:       Neurologic:       Cardiovascular:       METS/Exercise Tolerance:     Hematologic:       Musculoskeletal:       GI/Hepatic:       Renal/Genitourinary:       Endo:       Psychiatric/Substance Use:       Infectious Disease:       Malignancy:   (+) Malignancy, History of Lymphoma/Leukemia.    Other:            Physical Exam    Airway        Mallampati: II   TM distance: > 3 FB   Neck ROM: full   Mouth opening: > 3 cm    Respiratory Devices and Support         Dental       (+) Minor Abnormalities - some fillings, tiny chips      Cardiovascular   cardiovascular exam normal       Rhythm and rate: regular and normal     Pulmonary   pulmonary exam normal        breath sounds clear to auscultation           OUTSIDE LABS:  CBC:   Lab Results   Component Value Date    WBC 6.1 2025    WBC 5.9 2019    HGB 16.6 2025    HGB 16.5 2019    HCT 48.1 2025    HCT 49.7 2019     2025     2019     BMP:   Lab Results   Component Value Date     2019    NA  "140 05/10/2012    POTASSIUM 4.2 05/03/2019    POTASSIUM 3.9 05/10/2012    CHLORIDE 104 05/03/2019    CHLORIDE 103 05/10/2012    CO2 25 05/03/2019    CO2 28 05/10/2012    BUN 16 05/03/2019    BUN 13 05/10/2012    CR 0.90 05/03/2019    CR 0.97 05/10/2012     (H) 03/31/2025    GLC 89 05/03/2019     COAGS: No results found for: \"PTT\", \"INR\", \"FIBR\"  POC: No results found for: \"BGM\", \"HCG\", \"HCGS\"  HEPATIC:   Lab Results   Component Value Date    ALBUMIN 4.1 05/03/2019    PROTTOTAL 7.0 05/03/2019    ALT 31 05/03/2019    AST 29 05/03/2019    ALKPHOS 80 05/03/2019    BILITOTAL 1.5 (H) 05/03/2019     OTHER:   Lab Results   Component Value Date    ANTHONY 9.7 05/03/2019    TSH 2.22 05/10/2012       Anesthesia Plan    ASA Status:  2    NPO Status:  NPO Appropriate    Anesthesia Type: General.     - Airway: Native airway   Induction: Intravenous, Propofol.   Maintenance: TIVA.        Consents    Anesthesia Plan(s) and associated risks, benefits, and realistic alternatives discussed. Questions answered and patient/representative(s) expressed understanding.     - Discussed: Risks, Benefits and Alternatives for BOTH SEDATION and the PROCEDURE were discussed     - Discussed with:  Patient            Postoperative Care       PONV prophylaxis: Background Propofol Infusion     Comments:               SUNG Bond CRNA    Clinically Significant Risk Factors Present on Admission                             # Obesity: Estimated body mass index is 31.13 kg/m  as calculated from the following:    Height as of 3/31/25: 1.781 m (5' 10.1\").    Weight as of 3/31/25: 98.7 kg (217 lb 9.6 oz).                "

## 2025-04-02 NOTE — TELEPHONE ENCOUNTER
Endoscopy Scheduling Screen      What insurance is in the chart?  Other:  BCBS    Ordering/Referring Provider: Mandeep Soto MD    (If ordering provider performs procedure, schedule with ordering provider unless otherwise instructed. )    BMI: There is no height or weight on file to calculate BMI.     Sedation Ordered  general anesthesia.   BMI<= 45 45 < BMI <= 48 48 < BMI < = 50  BMI > 50   No Restrictions No [facility]  No [facility]  [facility] with exceptions Hospital Only OR Only       Do you have a history of malignant hyperthermia?  NO    (Females) Are you currently pregnant?   NO     Are you currently on dialysis?   NO    Do you need assistance transferring?   NO    BMI: There is no height or weight on file to calculate BMI.     Is patients BMI > 50?  NO    BMI > 40?  NO    Do you have a diagnosis of diabetes?  NO    Do you take an Oral or Injectable medication for weight loss or diabetes (excluding insulin)?  NO    Do you take the medication Naltrexone?  NO    Do you take blood thinners?  NO    Prep   Are you currently have chronic kidney disease?  NO    Do you have a diagnosis of cystic fibrosis (CF)?  NO    On a regular basis do you go 3 -5 days between bowel movements?  NO    Preferred Pharmacy:    [facility] - [place], [place] - [address]  [address] [address]  Phone: [phone] Fax: [phone] Alternate Fax: [phone], [phone]    [id number] - [place], [place] - [address]  [address] [address]  Phone: [phone] Fax: [phone]      Final Scheduling Details     Procedure scheduled  Colonoscopy    Surgeon:  SAMI      Date of procedure:  4/21/25     Location  [place] - Per order.    What is your communication preference for Instructions and/or Bowel Prep?   MyChart    Patient Reminders:    You will receive a call from a Nurse to review instructions and health history.  This assessment must be  completed prior to your procedure.  Failure to complete the Nurse assessment may result in the procedure being cancelled.       On the day of your procedure, please designate an adult(s) who can drive you home stay with you for the next 24 hours. The medicines used in the exam will make you sleepy. You will not be able to drive.       You cannot take public transportation, ride share services, or non-medical taxi service without a responsible caregiver.  Medical transport services are allowed with the requirement that a responsible caregiver will receive you at your destination.  We require that drivers and caregivers are confirmed prior to your procedure.

## 2025-04-21 ENCOUNTER — HOSPITAL ENCOUNTER (OUTPATIENT)
Facility: CLINIC | Age: 55
Discharge: HOME | End: 2025-04-21
Attending: SURGERY | Admitting: SURGERY
Payer: COMMERCIAL

## 2025-04-21 ENCOUNTER — ANESTHESIA (OUTPATIENT)
Dept: GASTROENTEROLOGY | Facility: CLINIC | Age: 55
End: 2025-04-21
Payer: COMMERCIAL

## 2025-04-21 VITALS
HEART RATE: 63 BPM | RESPIRATION RATE: 18 BRPM | HEIGHT: 70 IN | BODY MASS INDEX: 31.07 KG/M2 | OXYGEN SATURATION: 98 % | SYSTOLIC BLOOD PRESSURE: 124 MMHG | WEIGHT: 217 LBS | DIASTOLIC BLOOD PRESSURE: 80 MMHG | TEMPERATURE: 98 F

## 2025-04-21 LAB — COLONOSCOPY: NORMAL

## 2025-04-21 PROCEDURE — 258N000003 HC RX IP 258 OP 636: Performed by: PHYSICIAN ASSISTANT

## 2025-04-21 PROCEDURE — 250N000009 HC RX 250: Performed by: PHYSICIAN ASSISTANT

## 2025-04-21 PROCEDURE — 370N000017 HC ANESTHESIA TECHNICAL FEE, PER MIN: Performed by: SURGERY

## 2025-04-21 PROCEDURE — 250N000009 HC RX 250

## 2025-04-21 PROCEDURE — 45378 DIAGNOSTIC COLONOSCOPY: CPT | Performed by: SURGERY

## 2025-04-21 PROCEDURE — 250N000011 HC RX IP 250 OP 636

## 2025-04-21 PROCEDURE — 258N000003 HC RX IP 258 OP 636

## 2025-04-21 PROCEDURE — G0105 COLORECTAL SCRN; HI RISK IND: HCPCS

## 2025-04-21 RX ORDER — PROPOFOL 10 MG/ML
INJECTION, EMULSION INTRAVENOUS PRN
Status: DISCONTINUED | OUTPATIENT
Start: 2025-04-21 | End: 2025-04-21

## 2025-04-21 RX ORDER — NALOXONE HYDROCHLORIDE 0.4 MG/ML
0.2 INJECTION, SOLUTION INTRAMUSCULAR; INTRAVENOUS; SUBCUTANEOUS
Status: CANCELLED | OUTPATIENT
Start: 2025-04-21

## 2025-04-21 RX ORDER — NALOXONE HYDROCHLORIDE 0.4 MG/ML
0.4 INJECTION, SOLUTION INTRAMUSCULAR; INTRAVENOUS; SUBCUTANEOUS
Status: CANCELLED | OUTPATIENT
Start: 2025-04-21

## 2025-04-21 RX ORDER — SODIUM CHLORIDE, SODIUM LACTATE, POTASSIUM CHLORIDE, CALCIUM CHLORIDE 600; 310; 30; 20 MG/100ML; MG/100ML; MG/100ML; MG/100ML
INJECTION, SOLUTION INTRAVENOUS CONTINUOUS PRN
Status: DISCONTINUED | OUTPATIENT
Start: 2025-04-21 | End: 2025-04-21

## 2025-04-21 RX ORDER — PROPOFOL 10 MG/ML
INJECTION, EMULSION INTRAVENOUS CONTINUOUS PRN
Status: DISCONTINUED | OUTPATIENT
Start: 2025-04-21 | End: 2025-04-21

## 2025-04-21 RX ORDER — FLUMAZENIL 0.1 MG/ML
0.2 INJECTION, SOLUTION INTRAVENOUS
Status: CANCELLED | OUTPATIENT
Start: 2025-04-21 | End: 2025-04-21

## 2025-04-21 RX ORDER — SODIUM CHLORIDE, SODIUM LACTATE, POTASSIUM CHLORIDE, CALCIUM CHLORIDE 600; 310; 30; 20 MG/100ML; MG/100ML; MG/100ML; MG/100ML
INJECTION, SOLUTION INTRAVENOUS CONTINUOUS
Status: DISCONTINUED | OUTPATIENT
Start: 2025-04-21 | End: 2025-04-21 | Stop reason: HOSPADM

## 2025-04-21 RX ORDER — GLYCOPYRROLATE 0.2 MG/ML
INJECTION, SOLUTION INTRAMUSCULAR; INTRAVENOUS PRN
Status: DISCONTINUED | OUTPATIENT
Start: 2025-04-21 | End: 2025-04-21

## 2025-04-21 RX ORDER — LIDOCAINE 40 MG/G
CREAM TOPICAL
Status: DISCONTINUED | OUTPATIENT
Start: 2025-04-21 | End: 2025-04-21 | Stop reason: HOSPADM

## 2025-04-21 RX ORDER — LIDOCAINE HYDROCHLORIDE 20 MG/ML
INJECTION, SOLUTION INFILTRATION; PERINEURAL PRN
Status: DISCONTINUED | OUTPATIENT
Start: 2025-04-21 | End: 2025-04-21

## 2025-04-21 RX ADMIN — PROPOFOL 100 MG: 10 INJECTION, EMULSION INTRAVENOUS at 11:00

## 2025-04-21 RX ADMIN — GLYCOPYRROLATE 0.1 MG: 0.2 INJECTION, SOLUTION INTRAMUSCULAR; INTRAVENOUS at 10:59

## 2025-04-21 RX ADMIN — PROPOFOL 200 MCG/KG/MIN: 10 INJECTION, EMULSION INTRAVENOUS at 11:00

## 2025-04-21 RX ADMIN — SODIUM CHLORIDE, SODIUM LACTATE, POTASSIUM CHLORIDE, AND CALCIUM CHLORIDE: .6; .31; .03; .02 INJECTION, SOLUTION INTRAVENOUS at 10:57

## 2025-04-21 RX ADMIN — SODIUM CHLORIDE, POTASSIUM CHLORIDE, SODIUM LACTATE AND CALCIUM CHLORIDE: 600; 310; 30; 20 INJECTION, SOLUTION INTRAVENOUS at 10:47

## 2025-04-21 RX ADMIN — LIDOCAINE HYDROCHLORIDE 80 MG: 20 INJECTION, SOLUTION INFILTRATION; PERINEURAL at 10:59

## 2025-04-21 RX ADMIN — LIDOCAINE HYDROCHLORIDE 0.1 ML: 10 INJECTION, SOLUTION EPIDURAL; INFILTRATION; INTRACAUDAL; PERINEURAL at 10:48

## 2025-04-21 ASSESSMENT — ACTIVITIES OF DAILY LIVING (ADL)
ADLS_ACUITY_SCORE: 41
ADLS_ACUITY_SCORE: 41

## 2025-04-21 NOTE — ANESTHESIA POSTPROCEDURE EVALUATION
Patient: Erum Sepulveda    Procedure: Procedure(s):  Colonoscopy       Anesthesia Type:  General    Note:  Disposition: Outpatient   Postop Pain Control: Uneventful            Sign Out: Well controlled pain   PONV: No   Neuro/Psych: Uneventful            Sign Out: Acceptable/Baseline neuro status   Airway/Respiratory: Uneventful            Sign Out: Acceptable/Baseline resp. status   CV/Hemodynamics: Uneventful            Sign Out: Acceptable CV status; No obvious hypovolemia; No obvious fluid overload   Other NRE: NONE   DID A NON-ROUTINE EVENT OCCUR? No           Last vitals:  Vitals:    04/21/25 1025   BP: (!) 123/95   Pulse: 70   Resp: 18   Temp: 36.7  C (98  F)   SpO2: 97%       Electronically Signed By: SUNG Cedillo CRNA  April 21, 2025  11:33 AM

## 2025-04-21 NOTE — H&P
[facility]    Pre-Endoscopy History and Physical     Erum Sepulveda MRN# [id number]   YOB: 1970 Age: 54 year old     Date of Procedure: (Not on file)  Primary care provider: Duncan Trivedi  Type of Endoscopy: Colonoscopy with possible biopsy, possible polypectomy  Reason for Procedure: Screening colonoscopy  Type of Anesthesia Anticipated: MAC    HPI:    Erum is a 54 year old male who will be undergoing the above procedure.      A history and physical has been performed. The patient's medications and allergies have been reviewed. The risks and benefits of the procedure and the sedation options and risks were discussed with the patient.  All questions were answered and informed consent was obtained.      He denies a personal or family history of anesthesia complications or bleeding disorders.     Patient presents for screening colonoscopy. Last colonoscopy 2020 at outside facility. Denies any blood thinner use. Positive Fhx of colon CA (brother). PSH includes ex lap for lymph node biopsy    Patient Active Problem List   Diagnosis    Extranodal lymphoma (H)        No past medical history on file.     Past Surgical History:   Procedure Laterality Date    REMOVAL OF SPERM DUCT(S)      Description: Surgery Of Male Genitalia Vasectomy;  Recorded: 06/13/2011;  Comments: june 13, 2011    REMOVAL OF SPERM DUCT(S)      Description: Surgery Of Male Genitalia Vasectomy;  Recorded: 07/29/2013;    [facility] EXPLORATORY OF ABDOMEN      Description: Exploratory Laparotomy;  Recorded: 05/10/2011;       Social History     Tobacco Use    Smoking status: Never    Smokeless tobacco: Never   Substance Use Topics    Alcohol use: Not on file       Family History   Problem Relation Age of Onset    Cancer Mother         endometrial cancer    Colon Cancer Brother     Heart Disease Maternal Grandfather     Cerebrovascular Disease Paternal Grandfather        Prior to Admission medications    Not on File  "      No Known Allergies     REVIEW OF SYSTEMS:   5 point ROS negative except as noted above in HPI, including Gen., Resp., CV, GI &  system review.    PHYSICAL EXAM:   There were no vitals taken for this visit. Estimated body mass index is 31.13 kg/m  as calculated from the following:    Height as of 3/31/25: 1.781 m (5' 10.1\").    Weight as of 3/31/25: 98.7 kg (217 lb 9.6 oz).   Constitutional: Awake, alert, no acute distress.  Eyes: No scleral icterus.  Conjunctiva are without injection.  ENMT: Mucous membranes moist, dentition and gums are intact.   Neck: Soft, supple, trachea midline.    Endocrine: n/a   Lymphatic: There is no cervical, submandibularadenopathy.  Respiratory: normal efforts on room air  Cardiovascular: extremities warm and well perfused  Abdomen: Non-distended, non-tender,  No masses,  Musculoskeletal: Full range of motion in the upper and lower extremities.    Skin: No skin rashes or lesions to inspection.  No petechia.    Neurologic: alerted and oriented 3x  Psychiatric: The patient's affect is not blunted and mood is appropriate.  DIAGNOSTICS:    Not indicated    IMPRESSION   ASA Class 1 - Healthy patient, no medical problems    PLAN:   Plan for Colonoscopy with possible biopsy, possible polypectomy. We discussed the risks, benefits and alternatives and the patient wished to proceed.  Patient is cleared for the above procedure.    The above has been forwarded to the consulting provider.    Gonzalez Kyle DO, PGY 2 on 4/21/2025 at 8:44 AM  [facility]        "

## 2025-04-21 NOTE — ANESTHESIA CARE TRANSFER NOTE
Patient: Erum Sepulveda    Procedure: Procedure(s):  Colonoscopy       Diagnosis: Screen for colon cancer [Z12.11]  Diagnosis Additional Information: No value filed.    Anesthesia Type:   General     Note:    Oropharynx: oropharynx clear of all foreign objects and spontaneously breathing  Level of Consciousness: drowsy  Oxygen Supplementation: room air    Independent Airway: airway patency satisfactory and stable  Dentition: dentition unchanged  Vital Signs Stable: post-procedure vital signs reviewed and stable  Report to RN Given: handoff report given  Patient transferred to: Phase II    Handoff Report: Identifed the Patient, Identified the Reponsible Provider, Reviewed the pertinent medical history, Discussed the surgical course, Reviewed Intra-OP anesthesia mangement and issues during anesthesia, Set expectations for post-procedure period and Allowed opportunity for questions and acknowledgement of understanding      Vitals:  Vitals Value Taken Time   BP 90/65 04/21/25 1130   Temp     Pulse 62 04/21/25 1130   Resp     SpO2 95 % 04/21/25 1131   Vitals shown include unfiled device data.    Electronically Signed By: SUNG Cedillo CRNA  April 21, 2025  11:33 AM

## (undated) RX ORDER — LIDOCAINE HYDROCHLORIDE 10 MG/ML
INJECTION, SOLUTION EPIDURAL; INFILTRATION; INTRACAUDAL; PERINEURAL
Status: DISPENSED
Start: 2025-04-21